# Patient Record
Sex: FEMALE | Race: OTHER | NOT HISPANIC OR LATINO
[De-identification: names, ages, dates, MRNs, and addresses within clinical notes are randomized per-mention and may not be internally consistent; named-entity substitution may affect disease eponyms.]

---

## 2023-04-26 PROBLEM — Z00.00 ENCOUNTER FOR PREVENTIVE HEALTH EXAMINATION: Status: ACTIVE | Noted: 2023-04-26

## 2023-05-04 ENCOUNTER — APPOINTMENT (OUTPATIENT)
Dept: PLASTIC SURGERY | Facility: CLINIC | Age: 53
End: 2023-05-04

## 2023-05-04 ENCOUNTER — OUTPATIENT (OUTPATIENT)
Dept: OUTPATIENT SERVICES | Facility: HOSPITAL | Age: 53
LOS: 1 days | End: 2023-05-04

## 2023-05-04 VITALS
HEIGHT: 63 IN | WEIGHT: 160 LBS | HEART RATE: 81 BPM | DIASTOLIC BLOOD PRESSURE: 71 MMHG | SYSTOLIC BLOOD PRESSURE: 119 MMHG | BODY MASS INDEX: 28.35 KG/M2 | RESPIRATION RATE: 15 BRPM

## 2023-05-05 DIAGNOSIS — Z01.89 ENCOUNTER FOR OTHER SPECIFIED SPECIAL EXAMINATIONS: ICD-10-CM

## 2024-02-26 ENCOUNTER — APPOINTMENT (OUTPATIENT)
Dept: PLASTIC SURGERY | Facility: CLINIC | Age: 54
End: 2024-02-26

## 2024-05-06 ENCOUNTER — APPOINTMENT (OUTPATIENT)
Dept: PLASTIC SURGERY | Facility: CLINIC | Age: 54
End: 2024-05-06

## 2024-09-12 ENCOUNTER — OUTPATIENT (OUTPATIENT)
Dept: OUTPATIENT SERVICES | Facility: HOSPITAL | Age: 54
LOS: 1 days | End: 2024-09-12

## 2024-09-12 ENCOUNTER — APPOINTMENT (OUTPATIENT)
Dept: PLASTIC SURGERY | Facility: CLINIC | Age: 54
End: 2024-09-12

## 2024-09-12 VITALS — HEART RATE: 64 BPM | DIASTOLIC BLOOD PRESSURE: 78 MMHG | SYSTOLIC BLOOD PRESSURE: 126 MMHG | RESPIRATION RATE: 15 BRPM

## 2024-09-12 NOTE — PROCEDURE
[FreeTextEntry1] : Rhytids [FreeTextEntry2] : Botox INjection [FreeTextEntry6] : The skin was cleaned with alcohol wipe and injected sterilely  50U botox was diluted w/ 1.25cc for 4U per /1. 20U to Glabella 14U to Forehead 6U to crows feet

## 2024-09-12 NOTE — ASSESSMENT
[FreeTextEntry1] : Ms Montaño presents for botox injection to upper face.  R/b/A were discussed with patient inlcuding asymmetry, brow ptosis and she was agreeable to proceed with injection  20U to glabella 14U to forehead 6U per side to crows feet  Pt tolerated procedure well  Basilio Medley

## 2024-09-13 DIAGNOSIS — Z09 ENCOUNTER FOR FOLLOW-UP EXAMINATION AFTER COMPLETED TREATMENT FOR CONDITIONS OTHER THAN MALIGNANT NEOPLASM: ICD-10-CM
